# Patient Record
Sex: MALE | Race: WHITE | NOT HISPANIC OR LATINO | Employment: FULL TIME | URBAN - METROPOLITAN AREA
[De-identification: names, ages, dates, MRNs, and addresses within clinical notes are randomized per-mention and may not be internally consistent; named-entity substitution may affect disease eponyms.]

---

## 2019-04-05 ENCOUNTER — HOSPITAL ENCOUNTER (EMERGENCY)
Facility: HOSPITAL | Age: 29
Discharge: HOME | End: 2019-04-05
Attending: EMERGENCY MEDICINE
Payer: COMMERCIAL

## 2019-04-05 ENCOUNTER — APPOINTMENT (EMERGENCY)
Dept: CT IMAGING | Facility: HOSPITAL | Age: 29
End: 2019-04-05
Payer: COMMERCIAL

## 2019-04-05 VITALS
WEIGHT: 123.02 LBS | HEART RATE: 111 BPM | SYSTOLIC BLOOD PRESSURE: 136 MMHG | TEMPERATURE: 97.9 F | OXYGEN SATURATION: 98 % | DIASTOLIC BLOOD PRESSURE: 78 MMHG | RESPIRATION RATE: 16 BRPM

## 2019-04-05 DIAGNOSIS — R10.31 RLQ ABDOMINAL PAIN: Primary | ICD-10-CM

## 2019-04-05 LAB
ALBUMIN SERPL BCP-MCNC: 4.5 G/DL (ref 3.5–5)
ALP SERPL-CCNC: 143 U/L (ref 46–116)
ALT SERPL W P-5'-P-CCNC: 34 U/L (ref 12–78)
ANION GAP SERPL CALCULATED.3IONS-SCNC: 11 MMOL/L (ref 4–13)
AST SERPL W P-5'-P-CCNC: 20 U/L (ref 5–45)
BASOPHILS # BLD AUTO: 0.05 THOUSANDS/ΜL (ref 0–0.1)
BASOPHILS NFR BLD AUTO: 1 % (ref 0–1)
BILIRUB SERPL-MCNC: 0.29 MG/DL (ref 0.2–1)
BUN SERPL-MCNC: 7 MG/DL (ref 5–25)
CALCIUM SERPL-MCNC: 9.8 MG/DL (ref 8.3–10.1)
CHLORIDE SERPL-SCNC: 102 MMOL/L (ref 100–108)
CO2 SERPL-SCNC: 29 MMOL/L (ref 21–32)
CREAT SERPL-MCNC: 0.93 MG/DL (ref 0.6–1.3)
EOSINOPHIL # BLD AUTO: 0 THOUSAND/ΜL (ref 0–0.61)
EOSINOPHIL NFR BLD AUTO: 0 % (ref 0–6)
ERYTHROCYTE [DISTWIDTH] IN BLOOD BY AUTOMATED COUNT: 11.9 % (ref 11.6–15.1)
GFR SERPL CREATININE-BSD FRML MDRD: 111 ML/MIN/1.73SQ M
GLUCOSE SERPL-MCNC: 126 MG/DL (ref 65–140)
HCT VFR BLD AUTO: 50.4 % (ref 36.5–49.3)
HGB BLD-MCNC: 17.2 G/DL (ref 12–17)
IMM GRANULOCYTES # BLD AUTO: 0.02 THOUSAND/UL (ref 0–0.2)
IMM GRANULOCYTES NFR BLD AUTO: 0 % (ref 0–2)
LIPASE SERPL-CCNC: 188 U/L (ref 73–393)
LYMPHOCYTES # BLD AUTO: 1.87 THOUSANDS/ΜL (ref 0.6–4.47)
LYMPHOCYTES NFR BLD AUTO: 19 % (ref 14–44)
MCH RBC QN AUTO: 30.2 PG (ref 26.8–34.3)
MCHC RBC AUTO-ENTMCNC: 34.1 G/DL (ref 31.4–37.4)
MCV RBC AUTO: 89 FL (ref 82–98)
MONOCYTES # BLD AUTO: 0.65 THOUSAND/ΜL (ref 0.17–1.22)
MONOCYTES NFR BLD AUTO: 7 % (ref 4–12)
NEUTROPHILS # BLD AUTO: 7.41 THOUSANDS/ΜL (ref 1.85–7.62)
NEUTS SEG NFR BLD AUTO: 73 % (ref 43–75)
NRBC BLD AUTO-RTO: 0 /100 WBCS
PLATELET # BLD AUTO: 273 THOUSANDS/UL (ref 149–390)
PMV BLD AUTO: 9.1 FL (ref 8.9–12.7)
POTASSIUM SERPL-SCNC: 3.7 MMOL/L (ref 3.5–5.3)
PROT SERPL-MCNC: 8.5 G/DL (ref 6.4–8.2)
RBC # BLD AUTO: 5.69 MILLION/UL (ref 3.88–5.62)
SODIUM SERPL-SCNC: 142 MMOL/L (ref 136–145)
WBC # BLD AUTO: 10 THOUSAND/UL (ref 4.31–10.16)

## 2019-04-05 PROCEDURE — 85025 COMPLETE CBC W/AUTO DIFF WBC: CPT | Performed by: EMERGENCY MEDICINE

## 2019-04-05 PROCEDURE — 83690 ASSAY OF LIPASE: CPT | Performed by: EMERGENCY MEDICINE

## 2019-04-05 PROCEDURE — 74177 CT ABD & PELVIS W/CONTRAST: CPT

## 2019-04-05 PROCEDURE — 80053 COMPREHEN METABOLIC PANEL: CPT | Performed by: EMERGENCY MEDICINE

## 2019-04-05 PROCEDURE — 99284 EMERGENCY DEPT VISIT MOD MDM: CPT

## 2019-04-05 PROCEDURE — 36415 COLL VENOUS BLD VENIPUNCTURE: CPT

## 2019-04-05 PROCEDURE — 96360 HYDRATION IV INFUSION INIT: CPT

## 2019-04-05 PROCEDURE — 99284 EMERGENCY DEPT VISIT MOD MDM: CPT | Performed by: EMERGENCY MEDICINE

## 2019-04-05 RX ORDER — ONDANSETRON 2 MG/ML
4 INJECTION INTRAMUSCULAR; INTRAVENOUS ONCE
Status: DISCONTINUED | OUTPATIENT
Start: 2019-04-05 | End: 2019-04-05 | Stop reason: HOSPADM

## 2019-04-05 RX ADMIN — IOHEXOL 100 ML: 350 INJECTION, SOLUTION INTRAVENOUS at 19:14

## 2019-04-05 RX ADMIN — SODIUM CHLORIDE 1000 ML: 0.9 INJECTION, SOLUTION INTRAVENOUS at 18:31

## 2024-01-26 ENCOUNTER — HOSPITAL ENCOUNTER (EMERGENCY)
Facility: HOSPITAL | Age: 34
Discharge: HOME | End: 2024-01-26
Attending: EMERGENCY MEDICINE | Admitting: EMERGENCY MEDICINE
Payer: COMMERCIAL

## 2024-01-26 VITALS
SYSTOLIC BLOOD PRESSURE: 148 MMHG | HEIGHT: 65 IN | DIASTOLIC BLOOD PRESSURE: 77 MMHG | WEIGHT: 135 LBS | RESPIRATION RATE: 18 BRPM | TEMPERATURE: 98.2 F | BODY MASS INDEX: 22.49 KG/M2 | HEART RATE: 100 BPM | OXYGEN SATURATION: 99 %

## 2024-01-26 DIAGNOSIS — H35.719 CENTRAL SEROUS RETINOPATHY: Primary | ICD-10-CM

## 2024-01-26 PROCEDURE — 99283 EMERGENCY DEPT VISIT LOW MDM: CPT

## 2024-01-26 PROCEDURE — 99283 EMERGENCY DEPT VISIT LOW MDM: CPT | Performed by: EMERGENCY MEDICINE

## 2024-01-26 NOTE — ED PROVIDER NOTES
History  Chief Complaint   Patient presents with    Blurred Vision     Pt said yesterday he woke up with blurred vision in his left eye. Pt said he has floaters as well. Pt says it has not gone away. Pt denies issues in right eye. Pt states he has a mild pressure on the left side of his head.     Agustin Cutler is a 33 y.o.  year old male  Past Medical History:  No date: GERD (gastroesophageal reflux disease)  Social History    Tobacco Use      Smoking status: Never      Smokeless tobacco: Never    Alcohol use: Never    Drug use: Never    Patient presents with:  Blurred Vision: Pt said yesterday he woke up with blurred vision in his left eye. Pt said he has floaters as well. Pt says it has not gone away. Pt denies issues in right eye. Pt states he has a mild pressure on the left side of his head.  This is a young 33-year-old male without medical problems and without any kind of eye trouble.  He does not use glasses.  He woke up from a nap with a fixed visual deficit that is like a round egg in front of him.  He is periphery is fine there is no pain associated with it.  However he has a slight ache in the side of his head.  No other focal visual deficits or neurological deficits associated with it.  He had no trauma or falls.  Vision itself is not blurred.    History obtained directly from the PATIENT              History provided by:  Patient   used: No        None       Past Medical History:   Diagnosis Date    GERD (gastroesophageal reflux disease)        History reviewed. No pertinent surgical history.    History reviewed. No pertinent family history.  I have reviewed and agree with the history as documented.    E-Cigarette/Vaping     E-Cigarette/Vaping Substances     Social History     Tobacco Use    Smoking status: Never    Smokeless tobacco: Never   Substance Use Topics    Alcohol use: Never    Drug use: Never       Review of Systems   Constitutional:  Negative for chills and fever.    HENT:  Negative for ear pain and sore throat.    Eyes:  Positive for visual disturbance. Negative for photophobia, pain, discharge, redness and itching.   Respiratory:  Negative for cough and shortness of breath.    Cardiovascular:  Negative for chest pain and palpitations.   Gastrointestinal:  Negative for abdominal pain and vomiting.   Genitourinary:  Negative for dysuria and hematuria.   Musculoskeletal:  Negative for arthralgias and back pain.   Skin:  Negative for color change and rash.   Neurological:  Negative for seizures and syncope.   All other systems reviewed and are negative.      Physical Exam  Physical Exam  Vitals and nursing note reviewed.   Constitutional:       General: He is not in acute distress.     Appearance: He is well-developed.   HENT:      Head: Normocephalic and atraumatic.      Mouth/Throat:      Mouth: Mucous membranes are moist.   Eyes:      General: No scleral icterus.        Right eye: No discharge.         Left eye: No discharge.      Extraocular Movements: Extraocular movements intact.      Conjunctiva/sclera: Conjunctivae normal.      Pupils: Pupils are equal, round, and reactive to light.      Comments: I did perform a funduscopic exam I do not see a return of detachment.   Cardiovascular:      Rate and Rhythm: Normal rate and regular rhythm.      Heart sounds: No murmur heard.  Pulmonary:      Effort: Pulmonary effort is normal. No respiratory distress.      Breath sounds: Normal breath sounds.   Abdominal:      Palpations: Abdomen is soft.      Tenderness: There is no abdominal tenderness.   Musculoskeletal:         General: No swelling.      Cervical back: Neck supple.   Skin:     General: Skin is warm and dry.      Capillary Refill: Capillary refill takes less than 2 seconds.   Neurological:      General: No focal deficit present.      Mental Status: He is alert and oriented to person, place, and time.   Psychiatric:         Mood and Affect: Mood normal.         Thought  Content: Thought content normal.         Judgment: Judgment normal.         Vital Signs  ED Triage Vitals [01/26/24 1418]   Temperature Pulse Respirations Blood Pressure SpO2   98.2 °F (36.8 °C) 100 18 148/77 99 %      Temp src Heart Rate Source Patient Position - Orthostatic VS BP Location FiO2 (%)   -- -- -- -- --      Pain Score       --           Vitals:    01/26/24 1418   BP: 148/77   Pulse: 100         Visual Acuity      ED Medications  Medications - No data to display    Diagnostic Studies  Results Reviewed       None                   No orders to display              Procedures  Procedures         ED Course                                             Medical Decision Making  I have consulted on Agustin Cutler from the ophthalmology service at 2:37 PM,  Discussed HPI, current vital signs, results of ED workup and plan of care.                Disposition  Final diagnoses:   Central serous retinopathy     Time reflects when diagnosis was documented in both MDM as applicable and the Disposition within this note       Time User Action Codes Description Comment    1/26/2024  2:56 PM Orion Travis Add [H35.719] Central serous retinopathy           ED Disposition       ED Disposition   Discharge    Condition   Stable    Date/Time   Fri Jan 26, 2024  2:56 PM    Comment   Agustin Cutler discharge to home/self care.                   Follow-up Information       Follow up With Specialties Details Why Contact Info    Jules Bullock MD Ophthalmology In 3 days call Monday for appointment [address] [address]  [phone]              There are no discharge medications for this patient.      No discharge procedures on file.    PDMP Review       None            ED Provider  Electronically Signed by             Orion Travis MD  01/26/24 2423

## 2024-01-26 NOTE — DISCHARGE INSTRUCTIONS
A  personal message from Dr. Orion Travis,  Thank you so much for allowing me to care for you today.    I pride myself in the care and attention I give all my patients.  I hope you were a witness to this tonight.   If for any reason your condition does not improve or worsens, or you have a question that was not answered during your visit you can feel free to text me on my personal phone #  # [phone].   I will answer to your message and continue your care past your emergency room visit.     Please understand that although you are being discharged because your condition has been deemed stable and able to be managed on an outpatient setting. However your condition may worsen as part of the natural progression of the illness/condition, if this occurs please come back to the emergency department for a repeat evaluation.    -------------------------------------------------------------------------------------    [phone] to call at 9 am tomorrow if worse, and call office [phone] Monday if getting better    --------------------------------------------------------------------------------------    What is central serous retinopathy?  Central serous retinopathy is a medical condition that occurs when fluid builds up behind the retina in your eye. The fluid can cause your retina to detach, leading to vision problems or vision loss.    Your retina is a layer of tissue behind each eye. It senses light and translates it into images your brain can understand.    The condition is also called central serous chorioretinopathy.    Who might get central serous retinopathy?  Central serous retinopathy can affect anyone, but it’s more common in:    Middle-aged men and people assigned male at birth (AMAB).  People with myopia (nearsightedness).  Those taking certain medications, especially corticosteroids.  How common is central serous retinopathy?  Central serous retinopathy occurs in about 6 to 10 people per 100,000,  depending on assigned sex and other risk factors.    ADVERTISEMENT  [facility] is a non-profit academic medical center. Advertising on our site helps support our mission. We do not endorse non-[facility] products or services. Policy    Symptoms and Causes  What causes central serous retinopathy?  Scientists don’t fully understand what causes central serous retinopathy. Stress appears to play a key role. Stress makes your body release a hormone called cortisol, which can cause inflammation (swelling) and fluid leakage. Studies have shown that people with high stress levels and poor coping skills are at higher risk for central serous retinopathy.    The condition is also associated with the use of medications containing corticosteroids, which treat inflammation. Corticosteroids come in several forms:    Creams for your skin to treat dermatologic disorders.  Inhalers, such as those used to treat asthma.  Injections into your joints for orthopaedic conditions.  Nasal sprays.  Oral tablets such as prednisone.  Other risk factors include:    Autoimmune disease, such as lupus and rheumatoid arthritis.  Heart disease or high blood pressure.  Infection with the bacterium Helicobacter pylori.  Kidney disease, such as glomerulonephritis.  Pregnancy.  Sleep problems, such as sleep apnea and insomnia.  Use of certain medications, such as those that treat nasal congestion and erectile dysfunction.  What are the symptoms of central serous retinopathy?  Central serous retinopathy can affect one eye or both at the same time. Symptoms might include:    Blurry vision, like a smudge in the center of your sight.  Dark spot in the center of your vision.  Darker or dim vision.  White items look dull or somewhat brown.  Objects seem smaller or further away than they are.  Straight lines look crooked or bent.  However, central serous chorioretinopathy doesn’t always cause symptoms. A person can have the condition but not have  vision problems.    ADVERTISEMENT  Diagnosis and Tests  How is central serous chorioretinopathy diagnosed?  If you have changes to your vision, seek medical attention from your primary care provider or an ophthalmologist (eye specialist). They’ll talk to you about your symptoms and may order certain retina tests:    Fluorescein angiography (IVFA): For IVFA, a healthcare professional will inject a dye into a vein in your arm. The dye spreads through your body and into your retina. Then, a healthcare provider takes pictures with a special camera to identify leaks.  Optical coherence tomography (OCT): A central serous retinopathy OCT scan looks at the back of your eye. It provides three-dimensional pictures of your retina so your healthcare provider can measure retinal thickness, identify swelling and detect serous retinal detachment.  Management and Treatment  What is the treatment for central serous retinopathy?  Many cases of central serous retinopathy go away on their own over a few weeks or months. So your healthcare provider may recommend monitoring, or “watch and wait.” During the monitoring period, they’ll repeat tests to ensure the fluid is draining.    Your healthcare provider also may ask you to stop taking medications that contribute to the eye condition. They’ll also  you to reduce stress.    If the fluid doesn’t drain on its own in a few months, your healthcare provider may recommend central serous retinopathy treatment:    Medications: Some medications may help treat central serous retinopathy. For example, anti-vascular endothelial growth factor can prevent new blood vessels from growing in your eyes. Certain diuretics can help reduce fluid.  Photodynamic therapy: For photodynamic therapy, a healthcare provider injects a drug called verteporfin into your arm. The drug travels to your eye. Then the healthcare provider uses a cold laser on the part of your eye that has the leak. The laser on the  verteporfin can close a leak.  Other laser treatments: Thermal laser treatment uses a heated laser to seal leaks. Micropulse laser uses smaller, shorter laser pulses.  Care at [facility]  Retinal Disease Treatment  Find a Doctor and Specialists  Make an Appointment  ADVERTISEMENT  Prevention  How can I reduce my risk of central serous retinopathy?  It may not be possible to prevent central serous retinopathy. But you can reduce your risk by managing stress and limiting the use of corticosteroids.    [place] / Prognosis  What can I expect if I have central serous retinopathy?  Many cases of central serous retinopathy go away on their own as fluid naturally drains. But treatment may be necessary if the condition continues for several months.    After the condition resolves, vision generally improves on its own, often returning to normal. But sometimes damage can be permanent, with vision changes that don’t improve.    The condition can happen again, even after successful treatment, in the same eye or your other eye. For this reason, you may need regular follow-up appointments with an ophthalmologist.    Living With  How do I take care of myself with central serous retinopathy?  Some lifestyle changes can help you prevent or improve central serous retinopathy:    Get at least seven hours of sleep every night.  Limit caffeine, alcohol and corticosteroids.  Manage and reduce stress, perhaps with exercise, meditation, time with loved ones or counseling.  If you’re experiencing vision loss, some coping strategies include:    Join a support group to meet people who understand.  Talk to loved ones or a counselor about your feelings and ways to cope.  Use aids to help you complete daily tasks safely.  When should I see my healthcare provider for vision changes?  If you experience any changes to your vision, talk to your primary care provider or an ophthalmologist.    Even though central serous retinopathy can go away on  its own, it can get worse and cause permanent vision changes or loss. And sometimes vision problems are a sign of underlying disease.    A note from [facility]    Central serous retinopathy occurs when fluid builds up behind the retina in your eye. If you have any vision changes or loss, talk to your primary care provider or an ophthalmologist. Early intervention can help ensure that vision problems don’t become permanent.